# Patient Record
Sex: FEMALE | Race: BLACK OR AFRICAN AMERICAN | NOT HISPANIC OR LATINO | Employment: FULL TIME | URBAN - METROPOLITAN AREA
[De-identification: names, ages, dates, MRNs, and addresses within clinical notes are randomized per-mention and may not be internally consistent; named-entity substitution may affect disease eponyms.]

---

## 2020-03-24 PROBLEM — E04.1 UNINODULAR GOITER: Status: ACTIVE | Noted: 2020-03-24

## 2020-03-24 PROBLEM — Z83.3 FAMILY HISTORY OF DIABETES MELLITUS: Status: ACTIVE | Noted: 2020-03-24

## 2021-01-15 ENCOUNTER — OFFICE VISIT (OUTPATIENT)
Dept: OBSTETRICS AND GYNECOLOGY | Facility: CLINIC | Age: 31
End: 2021-01-15
Payer: MEDICAID

## 2021-01-15 VITALS — DIASTOLIC BLOOD PRESSURE: 94 MMHG | HEART RATE: 76 BPM | WEIGHT: 166 LBS | SYSTOLIC BLOOD PRESSURE: 145 MMHG

## 2021-01-15 DIAGNOSIS — N89.8 VAGINAL IRRITATION: Primary | ICD-10-CM

## 2021-01-15 PROCEDURE — 99203 PR OFFICE/OUTPT VISIT, NEW, LEVL III, 30-44 MIN: ICD-10-PCS | Mod: S$GLB,,, | Performed by: STUDENT IN AN ORGANIZED HEALTH CARE EDUCATION/TRAINING PROGRAM

## 2021-01-15 PROCEDURE — 99203 OFFICE O/P NEW LOW 30 MIN: CPT | Mod: S$GLB,,, | Performed by: STUDENT IN AN ORGANIZED HEALTH CARE EDUCATION/TRAINING PROGRAM

## 2021-01-18 LAB
CHLAMYDIA: NEGATIVE
GONORRHEA: NEGATIVE
SOURCE: NORMAL
SOURCE: NORMAL
TRICHOMONAS AMPLIFIED: NEGATIVE

## 2021-02-01 ENCOUNTER — OFFICE VISIT (OUTPATIENT)
Dept: OBSTETRICS AND GYNECOLOGY | Facility: CLINIC | Age: 31
End: 2021-02-01
Payer: MEDICAID

## 2021-02-01 VITALS — WEIGHT: 164 LBS | DIASTOLIC BLOOD PRESSURE: 80 MMHG | SYSTOLIC BLOOD PRESSURE: 124 MMHG | HEART RATE: 54 BPM

## 2021-02-01 DIAGNOSIS — Z01.419 WOMEN'S ANNUAL ROUTINE GYNECOLOGICAL EXAMINATION: Primary | ICD-10-CM

## 2021-02-01 LAB — HPV APTIMA: NEGATIVE

## 2021-02-01 PROCEDURE — 99395 PR PREVENTIVE VISIT,EST,18-39: ICD-10-PCS | Mod: S$GLB,,, | Performed by: STUDENT IN AN ORGANIZED HEALTH CARE EDUCATION/TRAINING PROGRAM

## 2021-02-01 PROCEDURE — 99395 PREV VISIT EST AGE 18-39: CPT | Mod: S$GLB,,, | Performed by: STUDENT IN AN ORGANIZED HEALTH CARE EDUCATION/TRAINING PROGRAM

## 2021-02-11 ENCOUNTER — PATIENT MESSAGE (OUTPATIENT)
Dept: OBSTETRICS AND GYNECOLOGY | Facility: CLINIC | Age: 31
End: 2021-02-11

## 2021-02-23 ENCOUNTER — OFFICE VISIT (OUTPATIENT)
Dept: FAMILY MEDICINE | Facility: CLINIC | Age: 31
End: 2021-02-23
Payer: MEDICAID

## 2021-02-23 VITALS
SYSTOLIC BLOOD PRESSURE: 110 MMHG | HEART RATE: 64 BPM | DIASTOLIC BLOOD PRESSURE: 76 MMHG | HEIGHT: 63 IN | WEIGHT: 166 LBS | OXYGEN SATURATION: 99 % | TEMPERATURE: 98 F | BODY MASS INDEX: 29.41 KG/M2

## 2021-02-23 DIAGNOSIS — R53.83 FATIGUE, UNSPECIFIED TYPE: ICD-10-CM

## 2021-02-23 DIAGNOSIS — G56.22 ULNAR NEUROPATHY OF LEFT UPPER EXTREMITY: Primary | ICD-10-CM

## 2021-02-23 PROCEDURE — 99203 OFFICE O/P NEW LOW 30 MIN: CPT | Mod: S$GLB,,, | Performed by: STUDENT IN AN ORGANIZED HEALTH CARE EDUCATION/TRAINING PROGRAM

## 2021-02-23 PROCEDURE — 99203 PR OFFICE/OUTPT VISIT, NEW, LEVL III, 30-44 MIN: ICD-10-PCS | Mod: S$GLB,,, | Performed by: STUDENT IN AN ORGANIZED HEALTH CARE EDUCATION/TRAINING PROGRAM

## 2021-02-23 RX ORDER — DICLOFENAC SODIUM 75 MG/1
75 TABLET, DELAYED RELEASE ORAL 2 TIMES DAILY
Qty: 28 TABLET | Refills: 0 | Status: SHIPPED | OUTPATIENT
Start: 2021-02-23 | End: 2021-03-09

## 2021-02-25 ENCOUNTER — CLINICAL SUPPORT (OUTPATIENT)
Dept: FAMILY MEDICINE | Facility: CLINIC | Age: 31
End: 2021-02-25
Payer: MEDICAID

## 2021-02-26 LAB
ABS NRBC COUNT: 0 X 10 3/UL (ref 0–0.01)
ABSOLUTE BASOPHIL: 0.04 X 10 3/UL (ref 0–0.22)
ABSOLUTE EOSINOPHIL: 0.28 X 10 3/UL (ref 0.04–0.54)
ABSOLUTE IMMATURE GRAN: 0.04 X 10 3/UL (ref 0–0.04)
ABSOLUTE LYMPHOCYTE: 2.21 X 10 3/UL (ref 0.86–4.75)
ABSOLUTE MONOCYTE: 0.58 X 10 3/UL (ref 0.22–1.08)
ALBUMIN SERPL-MCNC: 4.1 G/DL (ref 3.5–5.2)
ALBUMIN/GLOB SERPL ELPH: 1.5 {RATIO} (ref 1–2.7)
ALP ISOS SERPL LEV INH-CCNC: 71 U/L (ref 35–105)
ALT (SGPT): 8 U/L (ref 0–33)
ANION GAP SERPL CALC-SCNC: 10 MMOL/L (ref 8–17)
AST SERPL-CCNC: 10 U/L (ref 0–32)
BASOPHILS NFR BLD: 0.5 % (ref 0.2–1.2)
BILIRUBIN, TOTAL: 0.63 MG/DL (ref 0–1.2)
BUN/CREAT SERPL: 9.4 (ref 6–20)
CALCIUM SERPL-MCNC: 8.8 MG/DL (ref 8.6–10.2)
CARBON DIOXIDE, CO2: 26 MMOL/L (ref 22–29)
CHLORIDE: 105 MMOL/L (ref 98–107)
CHOLEST SERPL-MSCNC: 174 MG/DL (ref 100–200)
CREAT SERPL-MCNC: 0.79 MG/DL (ref 0.5–0.9)
EOSINOPHIL NFR BLD: 3.3 % (ref 0.7–7)
GFR ESTIMATION: 84.34
GLOBULIN: 2.7 G/DL (ref 1.5–4.5)
GLUCOSE: 111 MG/DL (ref 74–106)
HCT VFR BLD AUTO: 42.6 % (ref 37–47)
HDLC SERPL-MCNC: 35 MG/DL
HGB BLD-MCNC: 13.7 G/DL (ref 12–16)
IMMATURE GRANULOCYTES: 0.5 % (ref 0–0.5)
LDL/HDL RATIO: 3.4 (ref 1–3)
LDLC SERPL CALC-MCNC: 118.6 MG/DL (ref 0–100)
LYMPHOCYTES NFR BLD: 26 % (ref 19.3–53.1)
MCH RBC QN AUTO: 27.5 PG (ref 27–32)
MCHC RBC AUTO-ENTMCNC: 32.2 G/DL (ref 32–36)
MCV RBC AUTO: 85.4 FL (ref 82–100)
MONOCYTES NFR BLD: 6.8 % (ref 4.7–12.5)
NEUTROPHILS # BLD AUTO: 5.35 X 10 3/UL (ref 2.15–7.56)
NEUTROPHILS NFR BLD: 62.9 %
NUCLEATED RED BLOOD CELLS: 0 /100 WBC (ref 0–0.2)
PLATELET # BLD AUTO: 272 X 10 3/UL (ref 135–400)
POTASSIUM: 4.1 MMOL/L (ref 3.5–5.1)
PROT SNV-MCNC: 6.8 G/DL (ref 6.4–8.3)
RBC # BLD AUTO: 4.99 X 10 6/UL (ref 4.2–5.4)
RDW-SD: 37.3 FL (ref 37–54)
SODIUM: 141 MMOL/L (ref 136–145)
T4, FREE: 1.23 NG/DL (ref 0.93–1.7)
TRIGL SERPL-MCNC: 102 MG/DL (ref 0–150)
TSH SERPL DL<=0.005 MIU/L-ACNC: 0.27 UIU/ML (ref 0.27–4.2)
UREA NITROGEN (BUN): 7.4 MG/DL (ref 6–20)
WBC # BLD: 8.5 X 10 3/UL (ref 4.3–10.8)

## 2021-03-04 ENCOUNTER — PATIENT MESSAGE (OUTPATIENT)
Dept: FAMILY MEDICINE | Facility: CLINIC | Age: 31
End: 2021-03-04

## 2021-03-12 PROBLEM — I10 HTN (HYPERTENSION): Status: ACTIVE | Noted: 2021-03-12

## 2021-03-12 PROBLEM — R07.9 CHEST PAIN: Status: ACTIVE | Noted: 2021-03-12

## 2021-04-12 ENCOUNTER — TELEPHONE (OUTPATIENT)
Dept: OBSTETRICS AND GYNECOLOGY | Facility: CLINIC | Age: 31
End: 2021-04-12

## 2021-04-28 ENCOUNTER — OFFICE VISIT (OUTPATIENT)
Dept: FAMILY MEDICINE | Facility: CLINIC | Age: 31
End: 2021-04-28
Payer: MEDICAID

## 2021-04-28 ENCOUNTER — PATIENT MESSAGE (OUTPATIENT)
Dept: FAMILY MEDICINE | Facility: CLINIC | Age: 31
End: 2021-04-28

## 2021-04-28 VITALS
HEIGHT: 63 IN | BODY MASS INDEX: 29.06 KG/M2 | OXYGEN SATURATION: 99 % | TEMPERATURE: 99 F | SYSTOLIC BLOOD PRESSURE: 146 MMHG | WEIGHT: 164 LBS | DIASTOLIC BLOOD PRESSURE: 96 MMHG | HEART RATE: 69 BPM

## 2021-04-28 DIAGNOSIS — S90.32XA CONTUSION OF LEFT FOOT, INITIAL ENCOUNTER: Primary | ICD-10-CM

## 2021-04-28 PROCEDURE — 99213 OFFICE O/P EST LOW 20 MIN: CPT | Mod: S$GLB,,, | Performed by: STUDENT IN AN ORGANIZED HEALTH CARE EDUCATION/TRAINING PROGRAM

## 2021-04-28 PROCEDURE — 99213 PR OFFICE/OUTPT VISIT, EST, LEVL III, 20-29 MIN: ICD-10-PCS | Mod: S$GLB,,, | Performed by: STUDENT IN AN ORGANIZED HEALTH CARE EDUCATION/TRAINING PROGRAM

## 2021-05-13 ENCOUNTER — OFFICE VISIT (OUTPATIENT)
Dept: OBSTETRICS AND GYNECOLOGY | Facility: CLINIC | Age: 31
End: 2021-05-13
Payer: MEDICAID

## 2021-05-13 VITALS
BODY MASS INDEX: 29.05 KG/M2 | DIASTOLIC BLOOD PRESSURE: 95 MMHG | HEART RATE: 75 BPM | WEIGHT: 164 LBS | SYSTOLIC BLOOD PRESSURE: 139 MMHG

## 2021-05-13 DIAGNOSIS — N89.8 VAGINAL IRRITATION: Primary | ICD-10-CM

## 2021-05-13 PROCEDURE — 99213 PR OFFICE/OUTPT VISIT, EST, LEVL III, 20-29 MIN: ICD-10-PCS | Mod: S$GLB,,, | Performed by: STUDENT IN AN ORGANIZED HEALTH CARE EDUCATION/TRAINING PROGRAM

## 2021-05-13 PROCEDURE — 99213 OFFICE O/P EST LOW 20 MIN: CPT | Mod: S$GLB,,, | Performed by: STUDENT IN AN ORGANIZED HEALTH CARE EDUCATION/TRAINING PROGRAM

## 2021-05-27 ENCOUNTER — PATIENT MESSAGE (OUTPATIENT)
Dept: OBSTETRICS AND GYNECOLOGY | Facility: CLINIC | Age: 31
End: 2021-05-27

## 2021-06-01 ENCOUNTER — OFFICE VISIT (OUTPATIENT)
Dept: OBSTETRICS AND GYNECOLOGY | Facility: CLINIC | Age: 31
End: 2021-06-01
Payer: MEDICAID

## 2021-06-01 VITALS
BODY MASS INDEX: 28.87 KG/M2 | HEART RATE: 81 BPM | SYSTOLIC BLOOD PRESSURE: 142 MMHG | DIASTOLIC BLOOD PRESSURE: 89 MMHG | WEIGHT: 163 LBS

## 2021-06-01 DIAGNOSIS — Z72.51 UNPROTECTED SEXUAL INTERCOURSE: ICD-10-CM

## 2021-06-01 DIAGNOSIS — N89.8 VAGINAL ODOR: ICD-10-CM

## 2021-06-01 DIAGNOSIS — N76.0 BV (BACTERIAL VAGINOSIS): Primary | ICD-10-CM

## 2021-06-01 DIAGNOSIS — B96.89 BV (BACTERIAL VAGINOSIS): Primary | ICD-10-CM

## 2021-06-01 PROCEDURE — 99213 PR OFFICE/OUTPT VISIT, EST, LEVL III, 20-29 MIN: ICD-10-PCS | Mod: S$GLB,,, | Performed by: STUDENT IN AN ORGANIZED HEALTH CARE EDUCATION/TRAINING PROGRAM

## 2021-06-01 PROCEDURE — 99213 OFFICE O/P EST LOW 20 MIN: CPT | Mod: S$GLB,,, | Performed by: STUDENT IN AN ORGANIZED HEALTH CARE EDUCATION/TRAINING PROGRAM

## 2021-06-01 RX ORDER — METRONIDAZOLE 500 MG/1
500 TABLET ORAL EVERY 12 HOURS
Qty: 14 TABLET | Refills: 0 | Status: SHIPPED | OUTPATIENT
Start: 2021-06-01 | End: 2021-06-08

## 2021-07-07 ENCOUNTER — OFFICE VISIT (OUTPATIENT)
Dept: FAMILY MEDICINE | Facility: CLINIC | Age: 31
End: 2021-07-07
Payer: MEDICAID

## 2021-07-07 VITALS
SYSTOLIC BLOOD PRESSURE: 120 MMHG | BODY MASS INDEX: 29.23 KG/M2 | WEIGHT: 165 LBS | HEART RATE: 81 BPM | HEIGHT: 63 IN | RESPIRATION RATE: 19 BRPM | TEMPERATURE: 99 F | OXYGEN SATURATION: 98 % | DIASTOLIC BLOOD PRESSURE: 76 MMHG

## 2021-07-07 DIAGNOSIS — R53.83 FATIGUE, UNSPECIFIED TYPE: Primary | ICD-10-CM

## 2021-07-07 DIAGNOSIS — G43.909 MIGRAINE WITHOUT STATUS MIGRAINOSUS, NOT INTRACTABLE, UNSPECIFIED MIGRAINE TYPE: ICD-10-CM

## 2021-07-07 DIAGNOSIS — R63.1 POLYDIPSIA: ICD-10-CM

## 2021-07-07 LAB
B-HCG UR QL: NEGATIVE
BILIRUB SERPL-MCNC: NEGATIVE MG/DL
BLOOD, POC UA: NEGATIVE
CTP QC/QA: YES
GLUCOSE SERPL-MCNC: 105 MG/DL (ref 70–110)
GLUCOSE UR QL STRIP: NEGATIVE
KETONES UR QL STRIP: NEGATIVE
LEUKOCYTE ESTERASE URINE, POC: NEGATIVE
NITRITE, POC UA: NEGATIVE
PH, POC UA: 6
PROTEIN, POC: NORMAL
SPECIFIC GRAVITY, POC UA: 1.03
UROBILINOGEN, POC UA: 0.2

## 2021-07-07 PROCEDURE — 82962 GLUCOSE BLOOD TEST: CPT | Mod: ,,, | Performed by: STUDENT IN AN ORGANIZED HEALTH CARE EDUCATION/TRAINING PROGRAM

## 2021-07-07 PROCEDURE — 81003 POCT URINALYSIS: ICD-10-PCS | Mod: QW,S$GLB,, | Performed by: STUDENT IN AN ORGANIZED HEALTH CARE EDUCATION/TRAINING PROGRAM

## 2021-07-07 PROCEDURE — 99213 PR OFFICE/OUTPT VISIT, EST, LEVL III, 20-29 MIN: ICD-10-PCS | Mod: S$GLB,,, | Performed by: STUDENT IN AN ORGANIZED HEALTH CARE EDUCATION/TRAINING PROGRAM

## 2021-07-07 PROCEDURE — 81003 URINALYSIS AUTO W/O SCOPE: CPT | Mod: QW,S$GLB,, | Performed by: STUDENT IN AN ORGANIZED HEALTH CARE EDUCATION/TRAINING PROGRAM

## 2021-07-07 PROCEDURE — 81025 POCT URINE PREGNANCY: ICD-10-PCS | Mod: S$GLB,,, | Performed by: STUDENT IN AN ORGANIZED HEALTH CARE EDUCATION/TRAINING PROGRAM

## 2021-07-07 PROCEDURE — 81025 URINE PREGNANCY TEST: CPT | Mod: S$GLB,,, | Performed by: STUDENT IN AN ORGANIZED HEALTH CARE EDUCATION/TRAINING PROGRAM

## 2021-07-07 PROCEDURE — 99213 OFFICE O/P EST LOW 20 MIN: CPT | Mod: S$GLB,,, | Performed by: STUDENT IN AN ORGANIZED HEALTH CARE EDUCATION/TRAINING PROGRAM

## 2021-07-07 PROCEDURE — 82962 POCT GLUCOSE, HAND-HELD DEVICE: ICD-10-PCS | Mod: ,,, | Performed by: STUDENT IN AN ORGANIZED HEALTH CARE EDUCATION/TRAINING PROGRAM

## 2021-07-08 LAB
ABS NRBC COUNT: 0 X 10 3/UL (ref 0–0.01)
ABSOLUTE BASOPHIL: 0.06 X 10 3/UL (ref 0–0.22)
ABSOLUTE EOSINOPHIL: 0.33 X 10 3/UL (ref 0.04–0.54)
ABSOLUTE IMMATURE GRAN: 0.05 X 10 3/UL (ref 0–0.04)
ABSOLUTE LYMPHOCYTE: 3.09 X 10 3/UL (ref 0.86–4.75)
ABSOLUTE MONOCYTE: 0.6 X 10 3/UL (ref 0.22–1.08)
ALBUMIN SERPL-MCNC: 4.4 G/DL (ref 3.5–5.2)
ALBUMIN/GLOB SERPL ELPH: 1.5 {RATIO} (ref 1–2.7)
ALP ISOS SERPL LEV INH-CCNC: 72 U/L (ref 35–105)
ALT (SGPT): 10 U/L (ref 0–33)
ANION GAP SERPL CALC-SCNC: 8 MMOL/L (ref 8–17)
AST SERPL-CCNC: 11 U/L (ref 0–32)
BASOPHILS NFR BLD: 0.5 % (ref 0.2–1.2)
BILIRUBIN, TOTAL: 0.63 MG/DL (ref 0–1.2)
BUN/CREAT SERPL: 9.4 (ref 6–20)
CALCIUM SERPL-MCNC: 9.3 MG/DL (ref 8.6–10.2)
CARBON DIOXIDE, CO2: 28 MMOL/L (ref 22–29)
CHLORIDE: 102 MMOL/L (ref 98–107)
CREAT SERPL-MCNC: 0.87 MG/DL (ref 0.5–0.9)
EOSINOPHIL NFR BLD: 3 % (ref 0.7–7)
ESTIMATED AVERAGE GLUCOSE: 112 MG/DL
GFR ESTIMATION: 75.46
GLOBULIN: 3 G/DL (ref 1.5–4.5)
GLUCOSE: 98 MG/DL (ref 74–106)
HBA1C MFR BLD: 5.5 % (ref 4–6)
HCT VFR BLD AUTO: 45 % (ref 37–47)
HGB BLD-MCNC: 14.7 G/DL (ref 12–16)
IMMATURE GRANULOCYTES: 0.5 % (ref 0–0.5)
LYMPHOCYTES NFR BLD: 28.1 % (ref 19.3–53.1)
MCH RBC QN AUTO: 27.5 PG (ref 27–32)
MCHC RBC AUTO-ENTMCNC: 32.7 G/DL (ref 32–36)
MCV RBC AUTO: 84.3 FL (ref 82–100)
MONOCYTES NFR BLD: 5.5 % (ref 4.7–12.5)
NEUTROPHILS # BLD AUTO: 6.86 X 10 3/UL (ref 2.15–7.56)
NEUTROPHILS NFR BLD: 62.4 %
NUCLEATED RED BLOOD CELLS: 0 /100 WBC (ref 0–0.2)
PLATELET # BLD AUTO: 251 X 10 3/UL (ref 135–400)
POTASSIUM: 4 MMOL/L (ref 3.5–5.1)
PROT SNV-MCNC: 7.4 G/DL (ref 6.4–8.3)
RBC # BLD AUTO: 5.34 X 10 6/UL (ref 4.2–5.4)
RDW-SD: 37.6 FL (ref 37–54)
SODIUM: 138 MMOL/L (ref 136–145)
TSH SERPL DL<=0.005 MIU/L-ACNC: 0.41 UIU/ML (ref 0.27–4.2)
UREA NITROGEN (BUN): 8.2 MG/DL (ref 6–20)
WBC # BLD: 10.99 X 10 3/UL (ref 4.3–10.8)

## 2021-09-29 ENCOUNTER — TELEPHONE (OUTPATIENT)
Dept: OBSTETRICS AND GYNECOLOGY | Facility: CLINIC | Age: 31
End: 2021-09-29

## 2021-09-30 ENCOUNTER — OFFICE VISIT (OUTPATIENT)
Dept: OBSTETRICS AND GYNECOLOGY | Facility: CLINIC | Age: 31
End: 2021-09-30
Payer: MEDICAID

## 2021-09-30 VITALS
WEIGHT: 166 LBS | DIASTOLIC BLOOD PRESSURE: 84 MMHG | SYSTOLIC BLOOD PRESSURE: 141 MMHG | HEART RATE: 54 BPM | BODY MASS INDEX: 29.41 KG/M2

## 2021-09-30 DIAGNOSIS — Z11.3 SCREENING FOR STD (SEXUALLY TRANSMITTED DISEASE): ICD-10-CM

## 2021-09-30 DIAGNOSIS — R10.31 RIGHT LOWER QUADRANT ABDOMINAL PAIN: Primary | ICD-10-CM

## 2021-09-30 DIAGNOSIS — R31.9 HEMATURIA, UNSPECIFIED TYPE: ICD-10-CM

## 2021-09-30 PROCEDURE — 99213 PR OFFICE/OUTPT VISIT, EST, LEVL III, 20-29 MIN: ICD-10-PCS | Mod: S$GLB,,, | Performed by: STUDENT IN AN ORGANIZED HEALTH CARE EDUCATION/TRAINING PROGRAM

## 2021-09-30 PROCEDURE — 99213 OFFICE O/P EST LOW 20 MIN: CPT | Mod: S$GLB,,, | Performed by: STUDENT IN AN ORGANIZED HEALTH CARE EDUCATION/TRAINING PROGRAM

## 2021-10-01 LAB
CHLAMYDIA: NEGATIVE
GONORRHEA: NEGATIVE
SOURCE: NORMAL

## 2021-10-02 LAB — URINE CULTURE, ROUTINE: NORMAL

## 2021-10-04 LAB
SOURCE: NORMAL
TRICHOMONAS AMPLIFIED: NEGATIVE

## 2021-10-27 ENCOUNTER — TELEPHONE (OUTPATIENT)
Dept: OBSTETRICS AND GYNECOLOGY | Facility: CLINIC | Age: 31
End: 2021-10-27
Payer: MEDICAID

## 2021-12-14 ENCOUNTER — TELEPHONE (OUTPATIENT)
Dept: OBSTETRICS AND GYNECOLOGY | Facility: CLINIC | Age: 31
End: 2021-12-14
Payer: MEDICAID

## 2021-12-14 DIAGNOSIS — R10.2 PELVIC PAIN: Primary | ICD-10-CM

## 2021-12-15 ENCOUNTER — PROCEDURE VISIT (OUTPATIENT)
Dept: OBSTETRICS AND GYNECOLOGY | Facility: CLINIC | Age: 31
End: 2021-12-15
Payer: MEDICAID

## 2021-12-15 DIAGNOSIS — R10.2 PELVIC PAIN: ICD-10-CM

## 2021-12-15 PROCEDURE — 76830 PR  ECHOGRAPHY,TRANSVAGINAL: ICD-10-PCS | Mod: S$GLB,,, | Performed by: STUDENT IN AN ORGANIZED HEALTH CARE EDUCATION/TRAINING PROGRAM

## 2021-12-15 PROCEDURE — 76830 TRANSVAGINAL US NON-OB: CPT | Mod: S$GLB,,, | Performed by: STUDENT IN AN ORGANIZED HEALTH CARE EDUCATION/TRAINING PROGRAM

## 2021-12-20 ENCOUNTER — OFFICE VISIT (OUTPATIENT)
Dept: OBSTETRICS AND GYNECOLOGY | Facility: CLINIC | Age: 31
End: 2021-12-20
Payer: MEDICAID

## 2021-12-20 VITALS
DIASTOLIC BLOOD PRESSURE: 88 MMHG | BODY MASS INDEX: 29.58 KG/M2 | HEART RATE: 72 BPM | SYSTOLIC BLOOD PRESSURE: 143 MMHG | WEIGHT: 167 LBS

## 2021-12-20 DIAGNOSIS — N83.201 CYST OF RIGHT OVARY: Primary | ICD-10-CM

## 2021-12-20 DIAGNOSIS — Z31.89 ENCOUNTER FOR FERTILITY PLANNING: ICD-10-CM

## 2021-12-20 DIAGNOSIS — R10.31 RIGHT LOWER QUADRANT ABDOMINAL PAIN: ICD-10-CM

## 2021-12-20 PROCEDURE — 99213 PR OFFICE/OUTPT VISIT, EST, LEVL III, 20-29 MIN: ICD-10-PCS | Mod: S$GLB,,, | Performed by: STUDENT IN AN ORGANIZED HEALTH CARE EDUCATION/TRAINING PROGRAM

## 2021-12-20 PROCEDURE — 99213 OFFICE O/P EST LOW 20 MIN: CPT | Mod: S$GLB,,, | Performed by: STUDENT IN AN ORGANIZED HEALTH CARE EDUCATION/TRAINING PROGRAM

## 2022-05-19 ENCOUNTER — PATIENT MESSAGE (OUTPATIENT)
Dept: OBSTETRICS AND GYNECOLOGY | Facility: CLINIC | Age: 32
End: 2022-05-19
Payer: MEDICAID

## 2022-08-24 ENCOUNTER — PATIENT OUTREACH (OUTPATIENT)
Dept: ADMINISTRATIVE | Facility: HOSPITAL | Age: 32
End: 2022-08-24
Payer: MEDICAID

## 2022-10-04 DIAGNOSIS — Z34.01 ENCOUNTER FOR SUPERVISION OF NORMAL FIRST PREGNANCY IN FIRST TRIMESTER: Primary | ICD-10-CM

## 2022-10-05 ENCOUNTER — PROCEDURE VISIT (OUTPATIENT)
Dept: OBSTETRICS AND GYNECOLOGY | Facility: CLINIC | Age: 32
End: 2022-10-05
Payer: MEDICAID

## 2022-10-05 DIAGNOSIS — Z34.01 ENCOUNTER FOR SUPERVISION OF NORMAL FIRST PREGNANCY IN FIRST TRIMESTER: ICD-10-CM

## 2022-10-05 PROCEDURE — 76801 US OB/GYN PROCEDURE (VIEWPOINT): ICD-10-PCS | Mod: S$GLB,,, | Performed by: STUDENT IN AN ORGANIZED HEALTH CARE EDUCATION/TRAINING PROGRAM

## 2022-10-05 PROCEDURE — 76801 OB US < 14 WKS SINGLE FETUS: CPT | Mod: S$GLB,,, | Performed by: STUDENT IN AN ORGANIZED HEALTH CARE EDUCATION/TRAINING PROGRAM

## 2022-10-06 PROBLEM — O26.899 RH NEGATIVE STATE IN ANTEPARTUM PERIOD: Status: ACTIVE | Noted: 2022-10-06

## 2022-10-06 PROBLEM — Z67.91 RH NEGATIVE STATE IN ANTEPARTUM PERIOD: Status: ACTIVE | Noted: 2022-10-06

## 2022-10-06 LAB
AMPHETAMINES (500): NEGATIVE NG/ML
ANTIBODY SCREEN: NEGATIVE
BARBITURATES (200): NEGATIVE NG/ML
BENZODIAZEPINES: NEGATIVE NG/ML
BLOOD GROUPING: NORMAL
BLOOD TYPE (D): NEGATIVE
COCAINE (150): NEGATIVE NG/ML
HBV SURFACE AG SERPL QL IA: NONREACTIVE
HCV IGG SERPL QL IA: NONREACTIVE
HIV 1+2 AB+HIV1 P24 AG SERPL QL IA: NONREACTIVE
MARIJUANA, THC (50): NEGATIVE NG/ML
METHADONE: NEGATIVE NG/ML
OPIATES: NEGATIVE NG/ML
OXYCODONE: NEGATIVE NG/ML
PH: 6.2 (ref 4.5–8)
PHENCYCLIDINE (25): NEGATIVE NG/ML
RUBELLA IGG SCREEN: NORMAL
SICKLE CELL PREP: NEGATIVE
SYPHILIS TREPONEMAL ANTIBODY: NONREACTIVE
URINE CREATININE D/S: 145.1 MG/DL

## 2022-11-07 ENCOUNTER — PATIENT MESSAGE (OUTPATIENT)
Dept: ADMINISTRATIVE | Facility: OTHER | Age: 32
End: 2022-11-07
Payer: MEDICAID

## 2023-05-22 ENCOUNTER — OFFICE VISIT (OUTPATIENT)
Dept: FAMILY MEDICINE | Facility: CLINIC | Age: 33
End: 2023-05-22
Payer: MEDICAID

## 2023-05-22 VITALS
RESPIRATION RATE: 18 BRPM | BODY MASS INDEX: 26.4 KG/M2 | HEIGHT: 63 IN | SYSTOLIC BLOOD PRESSURE: 140 MMHG | WEIGHT: 149 LBS | HEART RATE: 80 BPM | OXYGEN SATURATION: 94 % | DIASTOLIC BLOOD PRESSURE: 88 MMHG

## 2023-05-22 DIAGNOSIS — Z87.59 HISTORY OF PRE-ECLAMPSIA: ICD-10-CM

## 2023-05-22 DIAGNOSIS — N73.2 POSTPARTUM PELVIC ABSCESS: ICD-10-CM

## 2023-05-22 DIAGNOSIS — Z79.899 ON LONG TERM DRUG THERAPY: ICD-10-CM

## 2023-05-22 DIAGNOSIS — I10 HYPERTENSION, UNSPECIFIED TYPE: Primary | ICD-10-CM

## 2023-05-22 DIAGNOSIS — D64.9 ANEMIA, UNSPECIFIED TYPE: ICD-10-CM

## 2023-05-22 LAB
ABS NRBC COUNT: 0 X 10 3/UL (ref 0–0.01)
ABSOLUTE BASOPHIL: 0.05 X 10 3/UL (ref 0–0.22)
ABSOLUTE EOSINOPHIL: 0.45 X 10 3/UL (ref 0.04–0.54)
ABSOLUTE IMMATURE GRAN: 0.13 X 10 3/UL (ref 0–0.04)
ABSOLUTE LYMPHOCYTE: 2.63 X 10 3/UL (ref 0.86–4.75)
ABSOLUTE MONOCYTE: 0.56 X 10 3/UL (ref 0.22–1.08)
ALBUMIN SERPL-MCNC: 4.1 G/DL (ref 3.5–5.2)
ALBUMIN/GLOB SERPL ELPH: 1.2 {RATIO} (ref 1–2.7)
ALP ISOS SERPL LEV INH-CCNC: 110 U/L (ref 35–105)
ALT (SGPT): 18 U/L (ref 0–33)
ANION GAP SERPL CALC-SCNC: 16 MMOL/L (ref 8–17)
AST SERPL-CCNC: 16 U/L (ref 0–32)
BASOPHILS NFR BLD: 0.5 % (ref 0.2–1.2)
BILIRUBIN, TOTAL: 0.43 MG/DL (ref 0–1.2)
BUN/CREAT SERPL: 11.3 (ref 6–20)
CALCIUM SERPL-MCNC: 9.9 MG/DL (ref 8.6–10.2)
CARBON DIOXIDE, CO2: 25 MMOL/L (ref 22–29)
CHLORIDE: 103 MMOL/L (ref 98–107)
CHOLEST SERPL-MSCNC: 256 MG/DL (ref 100–200)
CREAT SERPL-MCNC: 1.05 MG/DL (ref 0.5–0.9)
EOSINOPHIL NFR BLD: 4.9 % (ref 0.7–7)
ESTIMATED AVERAGE GLUCOSE: 104 MG/DL
GFR ESTIMATION: 71.5
GLOBULIN: 3.5 G/DL (ref 1.5–4.5)
GLUCOSE: 85 MG/DL (ref 74–106)
HBA1C MFR BLD: 5.2 % (ref 4–6)
HCT VFR BLD AUTO: 41.8 % (ref 37–47)
HDLC SERPL-MCNC: 38 MG/DL
HGB BLD-MCNC: 12.3 G/DL (ref 12–16)
IMMATURE GRANULOCYTES: 1.4 % (ref 0–0.5)
LDL/HDL RATIO: 4.7 (ref 1–3)
LDLC SERPL CALC-MCNC: 177 MG/DL (ref 0–100)
LYMPHOCYTES NFR BLD: 28.7 % (ref 19.3–53.1)
MCH RBC QN AUTO: 24.7 PG (ref 27–32)
MCHC RBC AUTO-ENTMCNC: 29.4 G/DL (ref 32–36)
MCV RBC AUTO: 83.9 FL (ref 82–100)
MONOCYTES NFR BLD: 6.1 % (ref 4.7–12.5)
NEUTROPHILS # BLD AUTO: 5.35 X 10 3/UL (ref 2.15–7.56)
NEUTROPHILS NFR BLD: 58.4 % (ref 34–71.1)
NUCLEATED RED BLOOD CELLS: 0 /100 WBC (ref 0–0.2)
PLATELET # BLD AUTO: 896 X 10 3/UL (ref 135–400)
POTASSIUM: 5.8 MMOL/L (ref 3.5–5.1)
PROT SNV-MCNC: 7.6 G/DL (ref 6.4–8.3)
RBC # BLD AUTO: 4.98 X 10 6/UL (ref 4.2–5.4)
RDW-SD: 53.2 FL (ref 37–54)
SMEAR REVIEW: NORMAL
SODIUM: 144 MMOL/L (ref 136–145)
T4, FREE: 1.29 NG/DL (ref 0.93–1.7)
TRIGL SERPL-MCNC: 205 MG/DL (ref 0–150)
TSH SERPL DL<=0.005 MIU/L-ACNC: 0.16 UIU/ML (ref 0.27–4.2)
UREA NITROGEN (BUN): 11.9 MG/DL (ref 6–20)
WBC # BLD: 9.17 X 10 3/UL (ref 4.3–10.8)

## 2023-05-22 PROCEDURE — 99214 PR OFFICE/OUTPT VISIT, EST, LEVL IV, 30-39 MIN: ICD-10-PCS | Mod: S$GLB,,, | Performed by: STUDENT IN AN ORGANIZED HEALTH CARE EDUCATION/TRAINING PROGRAM

## 2023-05-22 PROCEDURE — 3008F PR BODY MASS INDEX (BMI) DOCUMENTED: ICD-10-PCS | Mod: CPTII,S$GLB,, | Performed by: STUDENT IN AN ORGANIZED HEALTH CARE EDUCATION/TRAINING PROGRAM

## 2023-05-22 PROCEDURE — 3079F DIAST BP 80-89 MM HG: CPT | Mod: CPTII,S$GLB,, | Performed by: STUDENT IN AN ORGANIZED HEALTH CARE EDUCATION/TRAINING PROGRAM

## 2023-05-22 PROCEDURE — 3077F PR MOST RECENT SYSTOLIC BLOOD PRESSURE >= 140 MM HG: ICD-10-PCS | Mod: CPTII,S$GLB,, | Performed by: STUDENT IN AN ORGANIZED HEALTH CARE EDUCATION/TRAINING PROGRAM

## 2023-05-22 PROCEDURE — 1159F PR MEDICATION LIST DOCUMENTED IN MEDICAL RECORD: ICD-10-PCS | Mod: CPTII,S$GLB,, | Performed by: STUDENT IN AN ORGANIZED HEALTH CARE EDUCATION/TRAINING PROGRAM

## 2023-05-22 PROCEDURE — 99214 OFFICE O/P EST MOD 30 MIN: CPT | Mod: S$GLB,,, | Performed by: STUDENT IN AN ORGANIZED HEALTH CARE EDUCATION/TRAINING PROGRAM

## 2023-05-22 PROCEDURE — 3079F PR MOST RECENT DIASTOLIC BLOOD PRESSURE 80-89 MM HG: ICD-10-PCS | Mod: CPTII,S$GLB,, | Performed by: STUDENT IN AN ORGANIZED HEALTH CARE EDUCATION/TRAINING PROGRAM

## 2023-05-22 PROCEDURE — 3008F BODY MASS INDEX DOCD: CPT | Mod: CPTII,S$GLB,, | Performed by: STUDENT IN AN ORGANIZED HEALTH CARE EDUCATION/TRAINING PROGRAM

## 2023-05-22 PROCEDURE — 1159F MED LIST DOCD IN RCRD: CPT | Mod: CPTII,S$GLB,, | Performed by: STUDENT IN AN ORGANIZED HEALTH CARE EDUCATION/TRAINING PROGRAM

## 2023-05-22 PROCEDURE — 3077F SYST BP >= 140 MM HG: CPT | Mod: CPTII,S$GLB,, | Performed by: STUDENT IN AN ORGANIZED HEALTH CARE EDUCATION/TRAINING PROGRAM

## 2023-05-22 RX ORDER — LABETALOL 200 MG/1
TABLET, FILM COATED ORAL 2 TIMES DAILY
COMMUNITY
End: 2023-05-22 | Stop reason: SDUPTHER

## 2023-05-22 RX ORDER — LABETALOL 300 MG/1
300 TABLET, FILM COATED ORAL 2 TIMES DAILY
Qty: 60 TABLET | Refills: 1 | Status: SHIPPED | OUTPATIENT
Start: 2023-05-22

## 2023-05-22 RX ORDER — HYDROCHLOROTHIAZIDE 12.5 MG/1
12.5 CAPSULE ORAL DAILY
COMMUNITY

## 2023-05-22 RX ORDER — AMOXICILLIN AND CLAVULANATE POTASSIUM 875; 125 MG/1; MG/1
1 TABLET, FILM COATED ORAL 2 TIMES DAILY
COMMUNITY
Start: 2023-05-19

## 2023-05-22 NOTE — PROGRESS NOTES
Subjective:      Patient ID: Linda Menard is a 33 y.o. female.    Chief Complaint: Hospital Follow Up (Had baby on the  ) and Hypertension      HPI:  33-year-old female presents today for hospital follow-up.  Patient states she recently had her baby on May 4th.  She did have postpartum complications.  Reports having a pelvic abscess after delivery.  She did have to have surgery.  Now has a DEAN drain.  States this was placed around the 16th of this month.  Patient has been doing well since discharge.  Denies any pain or irritation around DEAN drain site.  Denies fever.  Denies any abdominal pain.  She does have some mild vaginal spotting.  No fever.  No nausea or vomiting.  She is not been monitoring her blood pressure.  She is taking her medications as prescribed.  Denies any lower extremity edema.  Denies chest pain or shortness of breath.    Past Medical History:   Diagnosis Date    Abnormal thyroid blood test     when pregnant for her child 9 years ago     Gestational diabetes     Irregular menses     PCOS (polycystic ovarian syndrome)     Polycystic ovaries      Past Surgical History:   Procedure Laterality Date    ADENOIDECTOMY       SECTION      HERNIA REPAIR      TONSILLECTOMY       Family History   Problem Relation Age of Onset    Diabetes Mother     Depression Maternal Grandmother     Hypertension Maternal Grandmother     Hypertension Father      Social History     Socioeconomic History    Marital status: Single   Tobacco Use    Smoking status: Never    Smokeless tobacco: Never   Substance and Sexual Activity    Alcohol use: Not Currently    Drug use: Never    Sexual activity: Not Currently     Partners: Male     Birth control/protection: None     Review of patient's allergies indicates:  No Known Allergies    Review of Systems   Constitutional:  Negative for activity change, appetite change, fatigue and unexpected weight change.   HENT:  Negative for sinus pain.    Respiratory:  " Negative for cough and shortness of breath.    Cardiovascular:  Negative for chest pain.   Gastrointestinal:  Negative for abdominal pain, blood in stool, constipation, diarrhea, nausea and vomiting.   Genitourinary:  Negative for difficulty urinating.   Musculoskeletal:  Negative for arthralgias and myalgias.   Neurological:  Negative for dizziness and headaches.   Psychiatric/Behavioral:  Negative for dysphoric mood. The patient is not nervous/anxious.      Objective:       BP (!) 140/88 (BP Location: Left arm, Patient Position: Sitting, BP Method: Large (Manual))   Pulse 80   Resp 18   Ht 5' 3" (1.6 m)   Wt 67.6 kg (149 lb)   LMP  (LMP Unknown)   SpO2 (!) 94%   Breastfeeding No   BMI 26.39 kg/m²   Physical Exam  Vitals and nursing note reviewed.   Constitutional:       Appearance: Normal appearance. She is well-developed.   HENT:      Head: Normocephalic and atraumatic.   Eyes:      Extraocular Movements: Extraocular movements intact.      Conjunctiva/sclera: Conjunctivae normal.      Pupils: Pupils are equal, round, and reactive to light.   Cardiovascular:      Rate and Rhythm: Normal rate and regular rhythm.      Heart sounds: Normal heart sounds.   Pulmonary:      Effort: Pulmonary effort is normal.      Breath sounds: Normal breath sounds.   Abdominal:      General: Bowel sounds are normal.      Palpations: Abdomen is soft.      Tenderness: There is no abdominal tenderness. There is no guarding or rebound.      Hernia: No hernia is present.      Comments: DEAN drain intact. No erythema around site. Serosanguinous drainage noted.    Musculoskeletal:         General: Normal range of motion.      Cervical back: Normal range of motion and neck supple.   Skin:     General: Skin is warm and dry.   Neurological:      General: No focal deficit present.      Mental Status: She is alert and oriented to person, place, and time.   Psychiatric:         Mood and Affect: Mood normal.       Assessment:     1. " Hypertension, unspecified type    2. History of pre-eclampsia    3. Anemia, unspecified type    4. Postpartum pelvic abscess    5. On long term drug therapy        Plan:   Hypertension, unspecified type  -     labetaloL (NORMODYNE) 300 MG tablet; Take 1 tablet (300 mg total) by mouth 2 (two) times daily.  Dispense: 60 tablet; Refill: 1  -     CBC Auto Differential; Future; Expected date: 05/22/2023  -     Comprehensive Metabolic Panel; Future; Expected date: 05/22/2023    History of pre-eclampsia  -     POCT Urinalysis(Instrument)    Anemia, unspecified type  -     CBC Auto Differential; Future; Expected date: 05/22/2023    Postpartum pelvic abscess  -     CBC Auto Differential; Future; Expected date: 05/22/2023    On long term drug therapy  -     CBC Auto Differential; Future; Expected date: 05/22/2023  -     Comprehensive Metabolic Panel; Future; Expected date: 05/22/2023  -     Hemoglobin A1C; Future; Expected date: 05/22/2023  -     TSH; Future; Expected date: 05/22/2023  -     T4, Free; Future; Expected date: 05/22/2023  -     Lipid Panel; Future; Expected date: 05/22/2023      Request records.      UA reviewed in clinic.      BP elevated in clinic.  Increase labetalol.  Monitor BP.  Keep log to review at follow-up.      Labs pending.      Has follow-up with gyn on Thursday.      ER precautions provided.  Patient stated understanding.      RTC in 1-2 weeks.  Sooner if needed.  Medication List with Changes/Refills   Current Medications    AMOXICILLIN-CLAVULANATE 875-125MG (AUGMENTIN) 875-125 MG PER TABLET    Take 1 tablet by mouth 2 (two) times daily.    HYDROCHLOROTHIAZIDE (MICROZIDE) 12.5 MG CAPSULE    Take 12.5 mg by mouth once daily.   Changed and/or Refilled Medications    Modified Medication Previous Medication    LABETALOL (NORMODYNE) 300 MG TABLET labetaloL (NORMODYNE) 200 MG tablet       Take 1 tablet (300 mg total) by mouth 2 (two) times daily.    Take by mouth 2 (two) times daily.               Disclaimer: This note may have been prepared using voice recognition software, it may have not been extensively proofed, as such there could be errors within the text such as sound alike errors.

## 2023-05-25 PROBLEM — O24.419 GESTATIONAL DIABETES: Status: ACTIVE | Noted: 2023-05-25

## 2023-05-31 ENCOUNTER — OFFICE VISIT (OUTPATIENT)
Dept: FAMILY MEDICINE | Facility: CLINIC | Age: 33
End: 2023-05-31
Payer: MEDICAID

## 2023-05-31 VITALS
DIASTOLIC BLOOD PRESSURE: 86 MMHG | SYSTOLIC BLOOD PRESSURE: 130 MMHG | HEIGHT: 63 IN | HEART RATE: 84 BPM | BODY MASS INDEX: 26.37 KG/M2 | OXYGEN SATURATION: 99 % | WEIGHT: 148.81 LBS

## 2023-05-31 DIAGNOSIS — I10 HYPERTENSION, UNSPECIFIED TYPE: Primary | ICD-10-CM

## 2023-05-31 DIAGNOSIS — D75.838 REACTIVE THROMBOCYTOSIS: ICD-10-CM

## 2023-05-31 PROCEDURE — 3044F HG A1C LEVEL LT 7.0%: CPT | Mod: CPTII,S$GLB,, | Performed by: STUDENT IN AN ORGANIZED HEALTH CARE EDUCATION/TRAINING PROGRAM

## 2023-05-31 PROCEDURE — 3008F PR BODY MASS INDEX (BMI) DOCUMENTED: ICD-10-PCS | Mod: CPTII,S$GLB,, | Performed by: STUDENT IN AN ORGANIZED HEALTH CARE EDUCATION/TRAINING PROGRAM

## 2023-05-31 PROCEDURE — 1159F MED LIST DOCD IN RCRD: CPT | Mod: CPTII,S$GLB,, | Performed by: STUDENT IN AN ORGANIZED HEALTH CARE EDUCATION/TRAINING PROGRAM

## 2023-05-31 PROCEDURE — 3008F BODY MASS INDEX DOCD: CPT | Mod: CPTII,S$GLB,, | Performed by: STUDENT IN AN ORGANIZED HEALTH CARE EDUCATION/TRAINING PROGRAM

## 2023-05-31 PROCEDURE — 99213 PR OFFICE/OUTPT VISIT, EST, LEVL III, 20-29 MIN: ICD-10-PCS | Mod: S$GLB,,, | Performed by: STUDENT IN AN ORGANIZED HEALTH CARE EDUCATION/TRAINING PROGRAM

## 2023-05-31 PROCEDURE — 3075F SYST BP GE 130 - 139MM HG: CPT | Mod: CPTII,S$GLB,, | Performed by: STUDENT IN AN ORGANIZED HEALTH CARE EDUCATION/TRAINING PROGRAM

## 2023-05-31 PROCEDURE — 3079F DIAST BP 80-89 MM HG: CPT | Mod: CPTII,S$GLB,, | Performed by: STUDENT IN AN ORGANIZED HEALTH CARE EDUCATION/TRAINING PROGRAM

## 2023-05-31 PROCEDURE — 3075F PR MOST RECENT SYSTOLIC BLOOD PRESS GE 130-139MM HG: ICD-10-PCS | Mod: CPTII,S$GLB,, | Performed by: STUDENT IN AN ORGANIZED HEALTH CARE EDUCATION/TRAINING PROGRAM

## 2023-05-31 PROCEDURE — 3079F PR MOST RECENT DIASTOLIC BLOOD PRESSURE 80-89 MM HG: ICD-10-PCS | Mod: CPTII,S$GLB,, | Performed by: STUDENT IN AN ORGANIZED HEALTH CARE EDUCATION/TRAINING PROGRAM

## 2023-05-31 PROCEDURE — 99213 OFFICE O/P EST LOW 20 MIN: CPT | Mod: S$GLB,,, | Performed by: STUDENT IN AN ORGANIZED HEALTH CARE EDUCATION/TRAINING PROGRAM

## 2023-05-31 PROCEDURE — 1159F PR MEDICATION LIST DOCUMENTED IN MEDICAL RECORD: ICD-10-PCS | Mod: CPTII,S$GLB,, | Performed by: STUDENT IN AN ORGANIZED HEALTH CARE EDUCATION/TRAINING PROGRAM

## 2023-05-31 PROCEDURE — 3044F PR MOST RECENT HEMOGLOBIN A1C LEVEL <7.0%: ICD-10-PCS | Mod: CPTII,S$GLB,, | Performed by: STUDENT IN AN ORGANIZED HEALTH CARE EDUCATION/TRAINING PROGRAM

## 2023-05-31 NOTE — PROGRESS NOTES
Subjective:      Patient ID: Linda Menard is a 33 y.o. female.    Chief Complaint: Follow-up      HPI:  33-year-old female presents today for follow-up.  Patient states overall she is been feeling well.  Denies any chest pain or shortness of breath.  Denies any lower extremity swelling.  She has been watching her blood pressure.  Average BP approximately 120 to 130/80.  She did see Dr. Zamora last Thursday.  States everything looked good.  She did have her drain removed.  Sees Dr. Zamora again on the .  Otherwise patient is doing well.  No other acute complaints at this time.    Past Medical History:   Diagnosis Date    Abnormal thyroid blood test     when pregnant for her child 9 years ago     Gestational diabetes     Irregular menses     PCOS (polycystic ovarian syndrome)     Polycystic ovaries      Past Surgical History:   Procedure Laterality Date    ADENOIDECTOMY       SECTION      HERNIA REPAIR      TONSILLECTOMY       Family History   Problem Relation Age of Onset    Diabetes Mother     Depression Maternal Grandmother     Hypertension Maternal Grandmother     Hypertension Father      Social History     Socioeconomic History    Marital status: Single   Tobacco Use    Smoking status: Never    Smokeless tobacco: Never   Substance and Sexual Activity    Alcohol use: Not Currently    Drug use: Never    Sexual activity: Not Currently     Partners: Male     Birth control/protection: None     Review of patient's allergies indicates:  No Known Allergies    Review of Systems   Constitutional:  Negative for activity change, appetite change, fatigue, fever and unexpected weight change.   HENT:  Negative for congestion, postnasal drip, rhinorrhea and sinus pain.    Respiratory:  Negative for cough and shortness of breath.    Cardiovascular:  Negative for chest pain and palpitations.   Gastrointestinal:  Negative for abdominal pain, nausea and vomiting.   Genitourinary:   "Negative for difficulty urinating.   Musculoskeletal:  Negative for arthralgias and myalgias.   Neurological:  Negative for dizziness and headaches.   Psychiatric/Behavioral:  Negative for decreased concentration and dysphoric mood. The patient is not nervous/anxious.      Objective:       /86 (BP Location: Right arm, Patient Position: Sitting, BP Method: Medium (Manual))   Pulse 84   Ht 5' 3" (1.6 m)   Wt 67.5 kg (148 lb 12.8 oz)   LMP  (LMP Unknown)   SpO2 99%   BMI 26.36 kg/m²   Physical Exam  Vitals and nursing note reviewed.   Constitutional:       Appearance: Normal appearance. She is well-developed.   HENT:      Head: Normocephalic and atraumatic.   Eyes:      Extraocular Movements: Extraocular movements intact.      Conjunctiva/sclera: Conjunctivae normal.      Pupils: Pupils are equal, round, and reactive to light.   Cardiovascular:      Rate and Rhythm: Normal rate and regular rhythm.      Heart sounds: Normal heart sounds.   Pulmonary:      Effort: Pulmonary effort is normal.      Breath sounds: Normal breath sounds.   Abdominal:      Palpations: Abdomen is soft.      Tenderness: There is no abdominal tenderness. There is no guarding or rebound.   Musculoskeletal:         General: Normal range of motion.      Cervical back: Normal range of motion and neck supple.   Skin:     General: Skin is warm and dry.   Neurological:      General: No focal deficit present.      Mental Status: She is alert and oriented to person, place, and time.   Psychiatric:         Mood and Affect: Mood normal.       Assessment:     1. Hypertension, unspecified type    2. Reactive thrombocytosis        Plan:   Hypertension, unspecified type  -     CBC Auto Differential; Future; Expected date: 05/31/2023  -     Comprehensive Metabolic Panel; Future; Expected date: 05/31/2023    Reactive thrombocytosis  -     CBC Auto Differential; Future; Expected date: 05/31/2023      BP reviewed in clinic.      Continue labetalol.  "     Repeat labs.      Continued ER precautions provided.  Patient stated understanding.    RTC in 1 month.  Sooner if needed.  Medication List with Changes/Refills   Current Medications    AMOXICILLIN-CLAVULANATE 875-125MG (AUGMENTIN) 875-125 MG PER TABLET    Take 1 tablet by mouth 2 (two) times daily.    HYDROCHLOROTHIAZIDE (MICROZIDE) 12.5 MG CAPSULE    Take 12.5 mg by mouth once daily.    LABETALOL (NORMODYNE) 300 MG TABLET    Take 1 tablet (300 mg total) by mouth 2 (two) times daily.              Disclaimer: This note may have been prepared using voice recognition software, it may have not been extensively proofed, as such there could be errors within the text such as sound alike errors.

## 2023-06-01 LAB
ABS NRBC COUNT: 0 X 10 3/UL (ref 0–0.01)
ABSOLUTE BASOPHIL: 0.06 X 10 3/UL (ref 0–0.22)
ABSOLUTE EOSINOPHIL: 0.45 X 10 3/UL (ref 0.04–0.54)
ABSOLUTE IMMATURE GRAN: 0.01 X 10 3/UL (ref 0–0.04)
ABSOLUTE LYMPHOCYTE: 2.9 X 10 3/UL (ref 0.86–4.75)
ABSOLUTE MONOCYTE: 0.51 X 10 3/UL (ref 0.22–1.08)
ALBUMIN SERPL-MCNC: 4 G/DL (ref 3.5–5.2)
ALBUMIN/GLOB SERPL ELPH: 1.4 {RATIO} (ref 1–2.7)
ALP ISOS SERPL LEV INH-CCNC: 98 U/L (ref 35–105)
ALT (SGPT): 11 U/L (ref 0–33)
ANION GAP SERPL CALC-SCNC: 13 MMOL/L (ref 8–17)
AST SERPL-CCNC: 15 U/L (ref 0–32)
BASOPHILS NFR BLD: 0.7 % (ref 0.2–1.2)
BILIRUBIN, TOTAL: 0.49 MG/DL (ref 0–1.2)
BUN/CREAT SERPL: 11.4 (ref 6–20)
CALCIUM SERPL-MCNC: 9.1 MG/DL (ref 8.6–10.2)
CARBON DIOXIDE, CO2: 22 MMOL/L (ref 22–29)
CHLORIDE: 105 MMOL/L (ref 98–107)
CREAT SERPL-MCNC: 0.78 MG/DL (ref 0.5–0.9)
EOSINOPHIL NFR BLD: 5.6 % (ref 0.7–7)
GFR ESTIMATION: 102.15 ML/MIN/1.73M2
GLOBULIN: 2.9 G/DL (ref 1.5–4.5)
GLUCOSE: 59 MG/DL (ref 74–106)
HCT VFR BLD AUTO: 41.5 % (ref 37–47)
HGB BLD-MCNC: 12.5 G/DL (ref 12–16)
IMMATURE GRANULOCYTES: 0.1 % (ref 0–0.5)
LYMPHOCYTES NFR BLD: 36.1 % (ref 19.3–53.1)
MCH RBC QN AUTO: 24.6 PG (ref 27–32)
MCHC RBC AUTO-ENTMCNC: 30.1 G/DL (ref 32–36)
MCV RBC AUTO: 81.5 FL (ref 82–100)
MONOCYTES NFR BLD: 6.3 % (ref 4.7–12.5)
NEUTROPHILS # BLD AUTO: 4.11 X 10 3/UL (ref 2.15–7.56)
NEUTROPHILS NFR BLD: 51.2 % (ref 34–71.1)
NUCLEATED RED BLOOD CELLS: 0 /100 WBC (ref 0–0.2)
PLATELET # BLD AUTO: 338 X 10 3/UL (ref 135–400)
POTASSIUM: ABNORMAL
PROT SNV-MCNC: 6.9 G/DL (ref 6.4–8.3)
RBC # BLD AUTO: 5.09 X 10 6/UL (ref 4.2–5.4)
RDW-SD: 48.1 FL (ref 37–54)
SODIUM: 140 MMOL/L (ref 136–145)
UREA NITROGEN (BUN): 8.9 MG/DL (ref 6–20)
WBC # BLD: 8.04 X 10 3/UL (ref 4.3–10.8)

## 2023-06-02 DIAGNOSIS — E87.5 HYPERKALEMIA: Primary | ICD-10-CM

## 2023-06-14 ENCOUNTER — PATIENT MESSAGE (OUTPATIENT)
Dept: FAMILY MEDICINE | Facility: CLINIC | Age: 33
End: 2023-06-14
Payer: MEDICAID

## 2024-05-17 ENCOUNTER — PATIENT OUTREACH (OUTPATIENT)
Dept: ADMINISTRATIVE | Facility: HOSPITAL | Age: 34
End: 2024-05-17
Payer: MEDICAID

## 2024-05-17 NOTE — PROGRESS NOTES
Working [facility] HPV Report:    Manually uploaded and linked HPV to . Frequency updated in HM to reflex correct repeat

## 2025-02-06 ENCOUNTER — OFFICE VISIT (OUTPATIENT)
Dept: FAMILY MEDICINE | Facility: CLINIC | Age: 35
End: 2025-02-06
Payer: MEDICAID

## 2025-02-06 VITALS
WEIGHT: 176 LBS | OXYGEN SATURATION: 99 % | RESPIRATION RATE: 18 BRPM | BODY MASS INDEX: 31.18 KG/M2 | SYSTOLIC BLOOD PRESSURE: 140 MMHG | HEART RATE: 74 BPM | DIASTOLIC BLOOD PRESSURE: 100 MMHG | TEMPERATURE: 99 F | HEIGHT: 63 IN

## 2025-02-06 DIAGNOSIS — Z79.899 ON LONG TERM DRUG THERAPY: ICD-10-CM

## 2025-02-06 DIAGNOSIS — I10 HYPERTENSION, UNSPECIFIED TYPE: Primary | ICD-10-CM

## 2025-02-06 DIAGNOSIS — M54.12 CERVICAL RADICULOPATHY: ICD-10-CM

## 2025-02-06 PROCEDURE — 99214 OFFICE O/P EST MOD 30 MIN: CPT | Mod: S$GLB,,, | Performed by: STUDENT IN AN ORGANIZED HEALTH CARE EDUCATION/TRAINING PROGRAM

## 2025-02-06 PROCEDURE — 3080F DIAST BP >= 90 MM HG: CPT | Mod: CPTII,S$GLB,, | Performed by: STUDENT IN AN ORGANIZED HEALTH CARE EDUCATION/TRAINING PROGRAM

## 2025-02-06 PROCEDURE — 3077F SYST BP >= 140 MM HG: CPT | Mod: CPTII,S$GLB,, | Performed by: STUDENT IN AN ORGANIZED HEALTH CARE EDUCATION/TRAINING PROGRAM

## 2025-02-06 PROCEDURE — 3008F BODY MASS INDEX DOCD: CPT | Mod: CPTII,S$GLB,, | Performed by: STUDENT IN AN ORGANIZED HEALTH CARE EDUCATION/TRAINING PROGRAM

## 2025-02-06 PROCEDURE — 1159F MED LIST DOCD IN RCRD: CPT | Mod: CPTII,S$GLB,, | Performed by: STUDENT IN AN ORGANIZED HEALTH CARE EDUCATION/TRAINING PROGRAM

## 2025-02-06 RX ORDER — LABETALOL 100 MG/1
100 TABLET, FILM COATED ORAL 2 TIMES DAILY
Qty: 60 TABLET | Refills: 1 | Status: SHIPPED | OUTPATIENT
Start: 2025-02-06 | End: 2026-02-06

## 2025-02-06 RX ORDER — TIZANIDINE 4 MG/1
4 TABLET ORAL EVERY 8 HOURS PRN
Qty: 30 TABLET | Refills: 0 | Status: SHIPPED | OUTPATIENT
Start: 2025-02-06 | End: 2025-02-16

## 2025-02-06 RX ORDER — NAPROXEN 500 MG/1
500 TABLET ORAL 2 TIMES DAILY
Qty: 30 TABLET | Refills: 0 | Status: SHIPPED | OUTPATIENT
Start: 2025-02-06

## 2025-02-06 NOTE — PROGRESS NOTES
Subjective:      Patient ID: Linda Menard is a 35 y.o. female.    Chief Complaint: Follow-up (Thighs hurting ) and Hypertension      HPI:  35-year-old female presents today for hospital follow-up.  Patient states she had her wisdom teeth removed recently.  Was having some leg pain following surgery.  She did go to the ER for this.  Her blood pressure was elevated.  They did say she needed to follow-up with PCP to resume blood pressure medication.  She has been off of labetalol for awhile now.  She does get headaches from time to time.  Also has some numbness and tingling down her left arm.  Denies chest pain or shortness of breath.  Denies any known injury to the neck.  Not interested in flu vaccine today.  She does have regular cycles.  LMP 2025.  Ready for blood work today.    Past Medical History:   Diagnosis Date    Abnormal thyroid blood test     when pregnant for her child 9 years ago     Gestational diabetes     Irregular menses     PCOS (polycystic ovarian syndrome)     Polycystic ovaries      Past Surgical History:   Procedure Laterality Date    ADENOIDECTOMY       SECTION      HERNIA REPAIR      TONSILLECTOMY       Family History   Problem Relation Name Age of Onset    Diabetes Mother      Depression Maternal Grandmother      Hypertension Maternal Grandmother      Hypertension Father       Social History     Socioeconomic History    Marital status: Single   Tobacco Use    Smoking status: Never    Smokeless tobacco: Never   Substance and Sexual Activity    Alcohol use: Not Currently    Drug use: Never    Sexual activity: Not Currently     Partners: Male     Birth control/protection: None     Review of patient's allergies indicates:  No Known Allergies    Review of Systems   Constitutional:  Negative for activity change, appetite change, fatigue, fever and unexpected weight change.   HENT:  Negative for congestion, postnasal drip, rhinorrhea and sinus pain.    Respiratory:   "Negative for cough and shortness of breath.    Cardiovascular:  Negative for chest pain and palpitations.   Gastrointestinal:  Negative for abdominal pain, nausea and vomiting.   Genitourinary:  Negative for difficulty urinating.   Musculoskeletal:  Positive for arthralgias. Negative for myalgias.   Neurological:  Positive for numbness. Negative for dizziness and headaches.   Psychiatric/Behavioral:  Negative for decreased concentration and dysphoric mood. The patient is not nervous/anxious.        Objective:       BP (!) 140/100 (BP Location: Left forearm, Patient Position: Sitting)   Pulse 74   Temp 99 °F (37.2 °C) (Oral)   Resp 18   Ht 5' 3" (1.6 m)   Wt 79.8 kg (176 lb)   SpO2 99%   BMI 31.18 kg/m²   Physical Exam  Vitals and nursing note reviewed.   Constitutional:       Appearance: Normal appearance. She is well-developed.   HENT:      Head: Normocephalic and atraumatic.   Eyes:      Extraocular Movements: Extraocular movements intact.      Conjunctiva/sclera: Conjunctivae normal.      Pupils: Pupils are equal, round, and reactive to light.   Cardiovascular:      Rate and Rhythm: Normal rate and regular rhythm.      Heart sounds: Normal heart sounds.   Pulmonary:      Effort: Pulmonary effort is normal.      Breath sounds: Normal breath sounds.   Abdominal:      Palpations: Abdomen is soft.      Tenderness: There is no abdominal tenderness. There is no right CVA tenderness, left CVA tenderness, guarding or rebound.      Hernia: No hernia is present.   Musculoskeletal:         General: Normal range of motion.      Cervical back: Normal range of motion and neck supple.   Skin:     General: Skin is warm and dry.   Neurological:      General: No focal deficit present.      Mental Status: She is alert and oriented to person, place, and time.   Psychiatric:         Mood and Affect: Mood normal.         Assessment:     1. Hypertension, unspecified type    2. Cervical radiculopathy    3. On long term drug " therapy        Plan:   Hypertension, unspecified type  -     labetaloL (NORMODYNE) 100 MG tablet; Take 1 tablet (100 mg total) by mouth 2 (two) times daily.  Dispense: 60 tablet; Refill: 1    Cervical radiculopathy  -     X-Ray Cervical Spine 2 or 3 Views; Future; Expected date: 02/06/2025  -     tiZANidine (ZANAFLEX) 4 MG tablet; Take 1 tablet (4 mg total) by mouth every 8 (eight) hours as needed.  Dispense: 30 tablet; Refill: 0  -     naproxen (NAPROSYN) 500 MG tablet; Take 1 tablet (500 mg total) by mouth 2 (two) times daily.  Dispense: 30 tablet; Refill: 0    On long term drug therapy  -     Hemoglobin A1C; Future; Expected date: 02/06/2025  -     Lipid Panel; Future; Expected date: 02/06/2025  -     TSH; Future; Expected date: 02/06/2025  -     T4, Free; Future; Expected date: 02/06/2025      Restart labetalol.  Monitor blood pressure.      I have independently reviewed imaging ordered and obtained today. Findings:  No acute fracture dislocation.  Minimal degenerative changes noted.  Reversal of normal cervical lordosis.      Trial tizanidine.  Trial of naproxen p.r.n. only.      Labs pending.      LMP 02/03/2025.    RTC in 4 weeks.  Sooner if needed.  Medication List with Changes/Refills   New Medications    LABETALOL (NORMODYNE) 100 MG TABLET    Take 1 tablet (100 mg total) by mouth 2 (two) times daily.    NAPROXEN (NAPROSYN) 500 MG TABLET    Take 1 tablet (500 mg total) by mouth 2 (two) times daily.    TIZANIDINE (ZANAFLEX) 4 MG TABLET    Take 1 tablet (4 mg total) by mouth every 8 (eight) hours as needed.   Current Medications    AMOXICILLIN-CLAVULANATE 875-125MG (AUGMENTIN) 875-125 MG PER TABLET    Take 1 tablet by mouth 2 (two) times daily.    HYDROCHLOROTHIAZIDE (MICROZIDE) 12.5 MG CAPSULE    Take 12.5 mg by mouth once daily.   Discontinued Medications    LABETALOL (NORMODYNE) 300 MG TABLET    Take 1 tablet (300 mg total) by mouth 2 (two) times daily.              Disclaimer: This note may have been  prepared using voice recognition software, it may have not been extensively proofed, as such there could be errors within the text such as sound alike errors.

## 2025-03-06 ENCOUNTER — OFFICE VISIT (OUTPATIENT)
Dept: FAMILY MEDICINE | Facility: CLINIC | Age: 35
End: 2025-03-06
Payer: MEDICAID

## 2025-03-06 ENCOUNTER — RESULTS FOLLOW-UP (OUTPATIENT)
Dept: FAMILY MEDICINE | Facility: CLINIC | Age: 35
End: 2025-03-06

## 2025-03-06 VITALS
OXYGEN SATURATION: 97 % | HEIGHT: 63 IN | BODY MASS INDEX: 31.12 KG/M2 | WEIGHT: 175.63 LBS | SYSTOLIC BLOOD PRESSURE: 130 MMHG | RESPIRATION RATE: 18 BRPM | HEART RATE: 73 BPM | DIASTOLIC BLOOD PRESSURE: 86 MMHG | TEMPERATURE: 98 F

## 2025-03-06 DIAGNOSIS — I10 HYPERTENSION, UNSPECIFIED TYPE: ICD-10-CM

## 2025-03-06 DIAGNOSIS — M54.12 CERVICAL RADICULOPATHY: Primary | ICD-10-CM

## 2025-03-06 PROCEDURE — 3079F DIAST BP 80-89 MM HG: CPT | Mod: CPTII,S$GLB,, | Performed by: STUDENT IN AN ORGANIZED HEALTH CARE EDUCATION/TRAINING PROGRAM

## 2025-03-06 PROCEDURE — 1159F MED LIST DOCD IN RCRD: CPT | Mod: CPTII,S$GLB,, | Performed by: STUDENT IN AN ORGANIZED HEALTH CARE EDUCATION/TRAINING PROGRAM

## 2025-03-06 PROCEDURE — 99214 OFFICE O/P EST MOD 30 MIN: CPT | Mod: S$GLB,,, | Performed by: STUDENT IN AN ORGANIZED HEALTH CARE EDUCATION/TRAINING PROGRAM

## 2025-03-06 PROCEDURE — 3075F SYST BP GE 130 - 139MM HG: CPT | Mod: CPTII,S$GLB,, | Performed by: STUDENT IN AN ORGANIZED HEALTH CARE EDUCATION/TRAINING PROGRAM

## 2025-03-06 PROCEDURE — 3008F BODY MASS INDEX DOCD: CPT | Mod: CPTII,S$GLB,, | Performed by: STUDENT IN AN ORGANIZED HEALTH CARE EDUCATION/TRAINING PROGRAM

## 2025-03-06 RX ORDER — PREDNISONE 20 MG/1
40 TABLET ORAL DAILY
Qty: 10 TABLET | Refills: 0 | Status: SHIPPED | OUTPATIENT
Start: 2025-03-06 | End: 2025-03-11

## 2025-03-06 NOTE — PROGRESS NOTES
Subjective:      Patient ID: Linda Menard is a 35 y.o. female.    Chief Complaint: Follow-up (Continued numbness and tingling LUE)      HPI:  35-year-old female presents today for continued numbness and tingling in her left upper extremity.  She has been taking the muscle relaxer at nighttime.  This does seem to help at night.  However after she wakes up in the morning her symptoms return.  Has not been able to find anything that she can take during the day to help with symptoms.  She is ready for blood work today.  Okay with trying physical therapy.  LMP 2025.  Not currently sexually active.  Blood pressure has been well controlled on labetalol.    Past Medical History:   Diagnosis Date    Abnormal thyroid blood test     when pregnant for her child 9 years ago     Gestational diabetes     Irregular menses     PCOS (polycystic ovarian syndrome)     Polycystic ovaries      Past Surgical History:   Procedure Laterality Date    ADENOIDECTOMY       SECTION      HERNIA REPAIR      TONSILLECTOMY       Family History   Problem Relation Name Age of Onset    Diabetes Mother      Depression Maternal Grandmother      Hypertension Maternal Grandmother      Hypertension Father       Social History[1]  Review of patient's allergies indicates:  No Known Allergies    Review of Systems   Constitutional:  Negative for activity change, appetite change, fatigue, fever and unexpected weight change.   HENT:  Negative for congestion, postnasal drip, rhinorrhea and sinus pain.    Respiratory:  Negative for cough and shortness of breath.    Cardiovascular:  Negative for chest pain and palpitations.   Gastrointestinal:  Negative for abdominal pain, nausea and vomiting.   Genitourinary:  Negative for difficulty urinating.   Musculoskeletal:  Negative for arthralgias and myalgias.   Neurological:  Positive for numbness. Negative for dizziness and headaches.   Psychiatric/Behavioral:  Negative for decreased  "concentration and dysphoric mood. The patient is not nervous/anxious.        Objective:       /86 (BP Location: Left arm, Patient Position: Sitting)   Pulse 73   Temp 97.7 °F (36.5 °C) (Oral)   Resp 18   Ht 5' 3" (1.6 m)   Wt 79.7 kg (175 lb 9.6 oz)   SpO2 97%   BMI 31.11 kg/m²   Physical Exam  Vitals and nursing note reviewed.   Constitutional:       Appearance: Normal appearance. She is well-developed.   HENT:      Head: Normocephalic and atraumatic.   Eyes:      Extraocular Movements: Extraocular movements intact.      Conjunctiva/sclera: Conjunctivae normal.      Pupils: Pupils are equal, round, and reactive to light.   Cardiovascular:      Rate and Rhythm: Normal rate and regular rhythm.      Heart sounds: Normal heart sounds.   Pulmonary:      Effort: Pulmonary effort is normal.      Breath sounds: Normal breath sounds.   Abdominal:      Palpations: Abdomen is soft.      Tenderness: There is no abdominal tenderness. There is no right CVA tenderness, left CVA tenderness, guarding or rebound.      Hernia: No hernia is present.   Musculoskeletal:         General: Normal range of motion.      Cervical back: Normal range of motion and neck supple.   Skin:     General: Skin is warm and dry.   Neurological:      General: No focal deficit present.      Mental Status: She is alert and oriented to person, place, and time.   Psychiatric:         Mood and Affect: Mood normal.         Assessment:     1. Cervical radiculopathy    2. Hypertension, unspecified type        Plan:   Cervical radiculopathy  -     predniSONE (DELTASONE) 20 MG tablet; Take 2 tablets (40 mg total) by mouth once daily. for 5 days  Dispense: 10 tablet; Refill: 0  -     Ambulatory referral/consult to Physical/Occupational Therapy; Future; Expected date: 03/13/2025    Hypertension, unspecified type      Trial of prednisone.      PT referral pending.      Consider MRI if no improvements.  Consider gabapentin.      Will obtain labs today.  "     RTC if symptoms worsen or no improvement.  Medication List with Changes/Refills   New Medications    PREDNISONE (DELTASONE) 20 MG TABLET    Take 2 tablets (40 mg total) by mouth once daily. for 5 days   Current Medications    AMOXICILLIN-CLAVULANATE 875-125MG (AUGMENTIN) 875-125 MG PER TABLET    Take 1 tablet by mouth 2 (two) times daily.    HYDROCHLOROTHIAZIDE (MICROZIDE) 12.5 MG CAPSULE    Take 12.5 mg by mouth once daily.    LABETALOL (NORMODYNE) 100 MG TABLET    Take 1 tablet (100 mg total) by mouth 2 (two) times daily.    NAPROXEN (NAPROSYN) 500 MG TABLET    Take 1 tablet (500 mg total) by mouth 2 (two) times daily.              Disclaimer: This note may have been prepared using voice recognition software, it may have not been extensively proofed, as such there could be errors within the text such as sound alike errors.          [1]   Social History  Socioeconomic History    Marital status: Single   Tobacco Use    Smoking status: Never    Smokeless tobacco: Never   Substance and Sexual Activity    Alcohol use: Not Currently    Drug use: Never    Sexual activity: Not Currently     Partners: Male     Birth control/protection: None

## 2025-03-10 ENCOUNTER — PATIENT MESSAGE (OUTPATIENT)
Dept: FAMILY MEDICINE | Facility: CLINIC | Age: 35
End: 2025-03-10
Payer: MEDICAID

## 2025-05-23 DIAGNOSIS — I10 HYPERTENSION, UNSPECIFIED TYPE: ICD-10-CM

## 2025-05-26 RX ORDER — LABETALOL 100 MG/1
100 TABLET, FILM COATED ORAL 2 TIMES DAILY
Qty: 180 TABLET | Refills: 1 | Status: SHIPPED | OUTPATIENT
Start: 2025-05-26